# Patient Record
Sex: FEMALE | Race: WHITE | NOT HISPANIC OR LATINO | Employment: FULL TIME | URBAN - METROPOLITAN AREA
[De-identification: names, ages, dates, MRNs, and addresses within clinical notes are randomized per-mention and may not be internally consistent; named-entity substitution may affect disease eponyms.]

---

## 2021-03-03 DIAGNOSIS — Z23 NEED FOR VACCINATION: ICD-10-CM

## 2023-04-19 ENCOUNTER — OFFICE VISIT (OUTPATIENT)
Dept: MEDICAL GROUP | Facility: MEDICAL CENTER | Age: 68
End: 2023-04-19
Payer: COMMERCIAL

## 2023-04-19 VITALS
SYSTOLIC BLOOD PRESSURE: 120 MMHG | TEMPERATURE: 98 F | HEART RATE: 85 BPM | HEIGHT: 71 IN | OXYGEN SATURATION: 95 % | BODY MASS INDEX: 23.94 KG/M2 | RESPIRATION RATE: 14 BRPM | DIASTOLIC BLOOD PRESSURE: 72 MMHG | WEIGHT: 171 LBS

## 2023-04-19 DIAGNOSIS — M81.0 OSTEOPOROSIS, UNSPECIFIED OSTEOPOROSIS TYPE, UNSPECIFIED PATHOLOGICAL FRACTURE PRESENCE: ICD-10-CM

## 2023-04-19 DIAGNOSIS — Z11.59 NEED FOR HEPATITIS C SCREENING TEST: ICD-10-CM

## 2023-04-19 DIAGNOSIS — Z00.00 HEALTHCARE MAINTENANCE: ICD-10-CM

## 2023-04-19 DIAGNOSIS — M79.7 FIBROMYALGIA: ICD-10-CM

## 2023-04-19 DIAGNOSIS — Z12.31 ENCOUNTER FOR SCREENING MAMMOGRAM FOR MALIGNANT NEOPLASM OF BREAST: ICD-10-CM

## 2023-04-19 DIAGNOSIS — R00.2 PALPITATION: ICD-10-CM

## 2023-04-19 PROCEDURE — 99203 OFFICE O/P NEW LOW 30 MIN: CPT | Performed by: STUDENT IN AN ORGANIZED HEALTH CARE EDUCATION/TRAINING PROGRAM

## 2023-04-19 RX ORDER — LORAZEPAM 0.5 MG/1
0.5 TABLET ORAL NIGHTLY
Qty: 90 TABLET | Refills: 0 | Status: SHIPPED | OUTPATIENT
Start: 2023-04-19 | End: 2023-07-18

## 2023-04-19 RX ORDER — PREGABALIN 150 MG/1
150 CAPSULE ORAL DAILY
Qty: 90 CAPSULE | Refills: 4 | Status: SHIPPED | OUTPATIENT
Start: 2023-04-19 | End: 2023-07-18

## 2023-04-19 ASSESSMENT — ENCOUNTER SYMPTOMS
FOCAL WEAKNESS: 0
NAUSEA: 0
ABDOMINAL PAIN: 0
VOMITING: 0
COUGH: 1
CHILLS: 0
SHORTNESS OF BREATH: 1
PALPITATIONS: 1
FEVER: 0

## 2023-04-19 ASSESSMENT — PATIENT HEALTH QUESTIONNAIRE - PHQ9: CLINICAL INTERPRETATION OF PHQ2 SCORE: 0

## 2023-04-19 NOTE — PROGRESS NOTES
"Subjective:     CC:  Diagnoses of Osteoporosis, unspecified osteoporosis type, unspecified pathological fracture presence, Need for hepatitis C screening test, Encounter for screening mammogram for malignant neoplasm of breast, Healthcare maintenance, Palpitation, and Fibromyalgia were pertinent to this visit.    HISTORY OF THE PRESENT ILLNESS: Patient is a 68 y.o. female. This pleasant patient is here today to establish care    Problem   Palpitation    Reports palpitation since 2022 after covid infection. It occurs everyday.      Osteoporosis   Need for Hepatitis C Screening Test   Encounter for Screening Mammogram for Malignant Neoplasm of Breast   Healthcare Maintenance   Fibromyalgia       Health Maintenance: Completed    ROS:   Review of Systems   Constitutional:  Negative for chills and fever.   Respiratory:  Positive for cough and shortness of breath.    Cardiovascular:  Positive for palpitations. Negative for chest pain and leg swelling.   Gastrointestinal:  Negative for abdominal pain, nausea and vomiting.   Skin:  Negative for rash.   Neurological:  Negative for focal weakness.       Objective:     Exam: /72 (BP Location: Left arm, Patient Position: Sitting, BP Cuff Size: Adult)   Pulse 85   Temp 36.7 °C (98 °F) (Temporal)   Resp 14   Ht 1.791 m (5' 10.5\")   Wt 77.6 kg (171 lb)   SpO2 95%  Body mass index is 24.19 kg/m².    Physical Exam  Vitals reviewed.   HENT:      Head: Normocephalic.      Mouth/Throat:      Mouth: Mucous membranes are moist.      Pharynx: Oropharynx is clear.   Eyes:      Pupils: Pupils are equal, round, and reactive to light.   Cardiovascular:      Rate and Rhythm: Normal rate and regular rhythm.   Pulmonary:      Effort: Pulmonary effort is normal. No respiratory distress.      Breath sounds: No wheezing or rales.   Abdominal:      General: Abdomen is flat. Bowel sounds are normal. There is no distension.      Tenderness: There is no abdominal tenderness. There is no " guarding.   Skin:     General: Skin is warm.   Neurological:      General: No focal deficit present.      Mental Status: She is alert and oriented to person, place, and time.     Labs: ordered    Assessment & Plan:   68 y.o. female with the following -    1. Osteoporosis, unspecified osteoporosis type, unspecified pathological fracture presence  H/o osteoporosis on DEXA  - DS-BONE DENSITY STUDY (DEXA); Future    2. Need for hepatitis C screening test  - HCV Scrn ( [id number] [employer] - Medicare Patients Only); Future    3. Encounter for screening mammogram for malignant neoplasm of breast  - MA-SCREENING MAMMO BILAT W/TOMOSYNTHESIS W/CAD; Future    4. Healthcare maintenance  Recommended covid booster, zoster vaccine at a pharmacy  - CBC WITH DIFFERENTIAL; Future  - Comp Metabolic Panel; Future  - Lipid Profile; Future  - HEMOGLOBIN A1C; Future  - TSH WITH REFLEX TO FT4; Future    5. Palpitation  Chronic.  - EKG showed sinus rhythm, no ST, T wave changes.  - Cardiac Event Monitor; Future  - EC-ECHOCARDIOGRAM COMPLETE W/O CONT; Future    6. Fibromyalgia  Chronic, stable.  - pregabalin (LYRICA) 150 MG Cap; Take 1 Capsule by mouth every day for 90 days.  Dispense: 90 Capsule; Refill: 4  - LORazepam (ATIVAN) 0.5 MG Tab; Take 1 Tablet by mouth every evening for 90 days.  Dispense: 90 Tablet; Refill: 0      Return in about 1 month (around 2023) for F/u labs.    Please note that this dictation was created using voice recognition software. I have made every reasonable attempt to correct obvious errors, but I expect that there are errors of grammar and possibly content that I did not discover before finalizing the note.

## 2023-06-01 ENCOUNTER — OFFICE VISIT (OUTPATIENT)
Dept: MEDICAL GROUP | Facility: MEDICAL CENTER | Age: 68
End: 2023-06-01
Payer: COMMERCIAL

## 2023-06-01 VITALS
BODY MASS INDEX: 23.94 KG/M2 | HEIGHT: 71 IN | WEIGHT: 171 LBS | OXYGEN SATURATION: 96 % | TEMPERATURE: 97.2 F | HEART RATE: 109 BPM | DIASTOLIC BLOOD PRESSURE: 70 MMHG | SYSTOLIC BLOOD PRESSURE: 120 MMHG

## 2023-06-01 DIAGNOSIS — R00.2 PALPITATION: ICD-10-CM

## 2023-06-01 DIAGNOSIS — M79.672 LEFT FOOT PAIN: ICD-10-CM

## 2023-06-01 DIAGNOSIS — M81.0 OSTEOPOROSIS, UNSPECIFIED OSTEOPOROSIS TYPE, UNSPECIFIED PATHOLOGICAL FRACTURE PRESENCE: ICD-10-CM

## 2023-06-01 PROCEDURE — 99213 OFFICE O/P EST LOW 20 MIN: CPT | Performed by: STUDENT IN AN ORGANIZED HEALTH CARE EDUCATION/TRAINING PROGRAM

## 2023-06-01 PROCEDURE — 3078F DIAST BP <80 MM HG: CPT | Performed by: STUDENT IN AN ORGANIZED HEALTH CARE EDUCATION/TRAINING PROGRAM

## 2023-06-01 PROCEDURE — 3074F SYST BP LT 130 MM HG: CPT | Performed by: STUDENT IN AN ORGANIZED HEALTH CARE EDUCATION/TRAINING PROGRAM

## 2023-06-01 ASSESSMENT — ENCOUNTER SYMPTOMS
CHILLS: 0
SHORTNESS OF BREATH: 0
FOCAL WEAKNESS: 0
FEVER: 0
NAUSEA: 0
ABDOMINAL PAIN: 0
COUGH: 0
VOMITING: 0

## 2023-06-01 NOTE — PROGRESS NOTES
"Subjective:     CC: left foot pain    HPI:   Aleksandra presents today with    Problem   Left Foot Pain    Patient reports left foot pain for 8 days after hiking. No trauma, fall.          Patient has [employer] insurance, which covers [facility], located in [place]. Mammogram was done in [facility] bus in [place] last week, no report available.    Health Maintenance: Completed    ROS:  Review of Systems   Constitutional:  Negative for chills and fever.   Respiratory:  Negative for cough and shortness of breath.    Cardiovascular:  Positive for leg swelling. Negative for chest pain.   Gastrointestinal:  Negative for abdominal pain, nausea and vomiting.   Neurological:  Negative for focal weakness.       Objective:     Exam:  /70 (BP Location: Right arm, Patient Position: Sitting, BP Cuff Size: Adult long)   Pulse (!) 109   Temp 36.2 °C (97.2 °F) (Temporal)   Ht 1.791 m (5' 10.5\")   Wt 77.6 kg (171 lb)   SpO2 96%   BMI 24.19 kg/m²  Body mass index is 24.19 kg/m².    Physical Exam  Vitals reviewed.   HENT:      Head: Normocephalic.      Mouth/Throat:      Mouth: Mucous membranes are moist.      Pharynx: Oropharynx is clear.   Eyes:      Pupils: Pupils are equal, round, and reactive to light.   Cardiovascular:      Rate and Rhythm: Normal rate and regular rhythm.   Pulmonary:      Effort: Pulmonary effort is normal. No respiratory distress.      Breath sounds: No wheezing or rales.   Abdominal:      General: Abdomen is flat. Bowel sounds are normal. There is no distension.      Tenderness: There is no abdominal tenderness. There is no guarding.   Musculoskeletal:         General: Swelling (left foot lateral dorsal surface close to ankle) and tenderness present.   Skin:     General: Skin is warm.   Neurological:      General: No focal deficit present.      Mental Status: She is alert and oriented to person, place, and time.       Labs: N/A, requested to bring labs done at [facility]    Assessment & Plan:     68 " y.o. female with the following -     1. Osteoporosis, unspecified osteoporosis type, unspecified pathological fracture presence  Due to insurance, she cannot do it at [facility]. Reordered, she needs to check with her insurance to find where it can be done.  - DS-BONE DENSITY STUDY (DEXA); Future    2. Palpitation  Due to insurance, she cannot do it at [facility]. Reordered, she needs to check with her insurance to find where it can be done.  - EC-ECHOCARDIOGRAM COMPLETE W/O CONT; Future    3. Left foot pain  Acute for 8 days after hiking. Patient used to be ballet dancer. No recent trauma, fall. Likely has osteoarthritis.  - OTC ibuprofen prn  - ice packs  - DX-FOOT-COMPLETE 3+ LEFT; Future  - follow up in 1 week after Xray done    Return in about 1 week (around 6/8/2023) for F/u labs, Med check.    Please note that this dictation was created using voice recognition software. I have made every reasonable attempt to correct obvious errors, but I expect that there are errors of grammar and possibly content that I did not discover before finalizing the note.

## 2023-06-05 ENCOUNTER — APPOINTMENT (OUTPATIENT)
Dept: MEDICAL GROUP | Facility: MEDICAL CENTER | Age: 68
End: 2023-06-05
Payer: COMMERCIAL

## 2023-06-06 ENCOUNTER — APPOINTMENT (OUTPATIENT)
Dept: URGENT CARE | Facility: PHYSICIAN GROUP | Age: 68
End: 2023-06-06
Payer: COMMERCIAL

## 2023-06-06 ENCOUNTER — APPOINTMENT (OUTPATIENT)
Dept: RADIOLOGY | Facility: MEDICAL CENTER | Age: 68
End: 2023-06-06
Attending: EMERGENCY MEDICINE
Payer: COMMERCIAL

## 2023-06-06 ENCOUNTER — OFFICE VISIT (OUTPATIENT)
Dept: MEDICAL GROUP | Facility: MEDICAL CENTER | Age: 68
End: 2023-06-06
Payer: COMMERCIAL

## 2023-06-06 ENCOUNTER — HOSPITAL ENCOUNTER (EMERGENCY)
Facility: MEDICAL CENTER | Age: 68
End: 2023-06-06
Attending: EMERGENCY MEDICINE
Payer: COMMERCIAL

## 2023-06-06 VITALS
OXYGEN SATURATION: 95 % | HEART RATE: 107 BPM | WEIGHT: 168 LBS | TEMPERATURE: 99 F | DIASTOLIC BLOOD PRESSURE: 70 MMHG | SYSTOLIC BLOOD PRESSURE: 122 MMHG | BODY MASS INDEX: 23.52 KG/M2 | HEIGHT: 71 IN | RESPIRATION RATE: 14 BRPM

## 2023-06-06 VITALS
DIASTOLIC BLOOD PRESSURE: 70 MMHG | SYSTOLIC BLOOD PRESSURE: 143 MMHG | TEMPERATURE: 98.2 F | WEIGHT: 169.75 LBS | HEIGHT: 71 IN | OXYGEN SATURATION: 94 % | BODY MASS INDEX: 23.77 KG/M2 | RESPIRATION RATE: 18 BRPM | HEART RATE: 78 BPM

## 2023-06-06 DIAGNOSIS — D75.1 ERYTHROCYTOSIS: ICD-10-CM

## 2023-06-06 DIAGNOSIS — E78.5 HYPERLIPIDEMIA, UNSPECIFIED HYPERLIPIDEMIA TYPE: ICD-10-CM

## 2023-06-06 DIAGNOSIS — M79.89 FOOT SWELLING: ICD-10-CM

## 2023-06-06 DIAGNOSIS — L03.116 CELLULITIS OF LEFT LOWER EXTREMITY: ICD-10-CM

## 2023-06-06 DIAGNOSIS — M79.672 LEFT FOOT PAIN: ICD-10-CM

## 2023-06-06 LAB
ALBUMIN SERPL BCP-MCNC: 4.2 G/DL (ref 3.2–4.9)
ALBUMIN/GLOB SERPL: 1.3 G/DL
ALP SERPL-CCNC: 90 U/L (ref 30–99)
ALT SERPL-CCNC: 24 U/L (ref 2–50)
ANION GAP SERPL CALC-SCNC: 14 MMOL/L (ref 7–16)
AST SERPL-CCNC: 25 U/L (ref 12–45)
BASOPHILS # BLD AUTO: 0.5 % (ref 0–1.8)
BASOPHILS # BLD: 0.04 K/UL (ref 0–0.12)
BILIRUB SERPL-MCNC: 0.8 MG/DL (ref 0.1–1.5)
BUN SERPL-MCNC: 12 MG/DL (ref 8–22)
CALCIUM ALBUM COR SERPL-MCNC: 9.3 MG/DL (ref 8.5–10.5)
CALCIUM SERPL-MCNC: 9.5 MG/DL (ref 8.5–10.5)
CHLORIDE SERPL-SCNC: 101 MMOL/L (ref 96–112)
CO2 SERPL-SCNC: 22 MMOL/L (ref 20–33)
CREAT SERPL-MCNC: 0.91 MG/DL (ref 0.5–1.4)
EOSINOPHIL # BLD AUTO: 0.04 K/UL (ref 0–0.51)
EOSINOPHIL NFR BLD: 0.5 % (ref 0–6.9)
ERYTHROCYTE [DISTWIDTH] IN BLOOD BY AUTOMATED COUNT: 43.1 FL (ref 35.9–50)
ERYTHROCYTE [SEDIMENTATION RATE] IN BLOOD BY WESTERGREN METHOD: 32 MM/HOUR (ref 0–25)
GFR SERPLBLD CREATININE-BSD FMLA CKD-EPI: 68 ML/MIN/1.73 M 2
GLOBULIN SER CALC-MCNC: 3.3 G/DL (ref 1.9–3.5)
GLUCOSE SERPL-MCNC: 100 MG/DL (ref 65–99)
HCT VFR BLD AUTO: 43.8 % (ref 37–47)
HGB BLD-MCNC: 15.1 G/DL (ref 12–16)
IMM GRANULOCYTES # BLD AUTO: 0.02 K/UL (ref 0–0.11)
IMM GRANULOCYTES NFR BLD AUTO: 0.2 % (ref 0–0.9)
LACTATE SERPL-SCNC: 0.7 MMOL/L (ref 0.5–2)
LYMPHOCYTES # BLD AUTO: 1.82 K/UL (ref 1–4.8)
LYMPHOCYTES NFR BLD: 20.7 % (ref 22–41)
MCH RBC QN AUTO: 32.5 PG (ref 27–33)
MCHC RBC AUTO-ENTMCNC: 34.5 G/DL (ref 32.2–35.5)
MCV RBC AUTO: 94.2 FL (ref 81.4–97.8)
MONOCYTES # BLD AUTO: 0.55 K/UL (ref 0–0.85)
MONOCYTES NFR BLD AUTO: 6.3 % (ref 0–13.4)
NEUTROPHILS # BLD AUTO: 6.32 K/UL (ref 1.82–7.42)
NEUTROPHILS NFR BLD: 71.8 % (ref 44–72)
NRBC # BLD AUTO: 0 K/UL
NRBC BLD-RTO: 0 /100 WBC (ref 0–0.2)
PLATELET # BLD AUTO: 265 K/UL (ref 164–446)
PMV BLD AUTO: 9.9 FL (ref 9–12.9)
POTASSIUM SERPL-SCNC: 4.3 MMOL/L (ref 3.6–5.5)
PROT SERPL-MCNC: 7.5 G/DL (ref 6–8.2)
RBC # BLD AUTO: 4.65 M/UL (ref 4.2–5.4)
SODIUM SERPL-SCNC: 137 MMOL/L (ref 135–145)
URATE SERPL-MCNC: 6.6 MG/DL (ref 1.9–8.2)
WBC # BLD AUTO: 8.8 K/UL (ref 4.8–10.8)

## 2023-06-06 PROCEDURE — 700111 HCHG RX REV CODE 636 W/ 250 OVERRIDE (IP): Performed by: EMERGENCY MEDICINE

## 2023-06-06 PROCEDURE — 83605 ASSAY OF LACTIC ACID: CPT

## 2023-06-06 PROCEDURE — 96375 TX/PRO/DX INJ NEW DRUG ADDON: CPT

## 2023-06-06 PROCEDURE — 99285 EMERGENCY DEPT VISIT HI MDM: CPT

## 2023-06-06 PROCEDURE — 700102 HCHG RX REV CODE 250 W/ 637 OVERRIDE(OP): Performed by: EMERGENCY MEDICINE

## 2023-06-06 PROCEDURE — 85652 RBC SED RATE AUTOMATED: CPT

## 2023-06-06 PROCEDURE — 80053 COMPREHEN METABOLIC PANEL: CPT

## 2023-06-06 PROCEDURE — 36415 COLL VENOUS BLD VENIPUNCTURE: CPT

## 2023-06-06 PROCEDURE — A9270 NON-COVERED ITEM OR SERVICE: HCPCS | Performed by: EMERGENCY MEDICINE

## 2023-06-06 PROCEDURE — 84550 ASSAY OF BLOOD/URIC ACID: CPT

## 2023-06-06 PROCEDURE — 3074F SYST BP LT 130 MM HG: CPT | Performed by: STUDENT IN AN ORGANIZED HEALTH CARE EDUCATION/TRAINING PROGRAM

## 2023-06-06 PROCEDURE — 99214 OFFICE O/P EST MOD 30 MIN: CPT | Performed by: STUDENT IN AN ORGANIZED HEALTH CARE EDUCATION/TRAINING PROGRAM

## 2023-06-06 PROCEDURE — 73610 X-RAY EXAM OF ANKLE: CPT | Mod: LT

## 2023-06-06 PROCEDURE — 85025 COMPLETE CBC W/AUTO DIFF WBC: CPT

## 2023-06-06 PROCEDURE — 3078F DIAST BP <80 MM HG: CPT | Performed by: STUDENT IN AN ORGANIZED HEALTH CARE EDUCATION/TRAINING PROGRAM

## 2023-06-06 PROCEDURE — 87040 BLOOD CULTURE FOR BACTERIA: CPT | Mod: 91

## 2023-06-06 PROCEDURE — 96374 THER/PROPH/DIAG INJ IV PUSH: CPT

## 2023-06-06 RX ORDER — HYDROCODONE BITARTRATE AND ACETAMINOPHEN 5; 325 MG/1; MG/1
1 TABLET ORAL EVERY 4 HOURS PRN
Qty: 20 TABLET | Refills: 0 | Status: SHIPPED | OUTPATIENT
Start: 2023-06-06 | End: 2023-06-09

## 2023-06-06 RX ORDER — KETOROLAC TROMETHAMINE 30 MG/ML
15 INJECTION, SOLUTION INTRAMUSCULAR; INTRAVENOUS ONCE
Status: COMPLETED | OUTPATIENT
Start: 2023-06-06 | End: 2023-06-06

## 2023-06-06 RX ORDER — CEFAZOLIN 2 G/1
2 INJECTION, POWDER, FOR SOLUTION INTRAMUSCULAR; INTRAVENOUS ONCE
Status: COMPLETED | OUTPATIENT
Start: 2023-06-06 | End: 2023-06-06

## 2023-06-06 RX ORDER — COLCHICINE 0.6 MG/1
1.2 TABLET ORAL ONCE
Status: COMPLETED | OUTPATIENT
Start: 2023-06-06 | End: 2023-06-06

## 2023-06-06 RX ORDER — CEPHALEXIN 500 MG/1
500 CAPSULE ORAL 4 TIMES DAILY
Qty: 40 CAPSULE | Refills: 0 | Status: ACTIVE | OUTPATIENT
Start: 2023-06-06

## 2023-06-06 RX ORDER — COLCHICINE 0.6 MG/1
0.6 TABLET ORAL DAILY
Qty: 30 TABLET | Refills: 0 | Status: SHIPPED | OUTPATIENT
Start: 2023-06-06

## 2023-06-06 RX ADMIN — COLCHICINE 1.2 MG: 0.6 TABLET, FILM COATED ORAL at 21:26

## 2023-06-06 RX ADMIN — CEFAZOLIN 2 G: 2 INJECTION, POWDER, FOR SOLUTION INTRAMUSCULAR; INTRAVENOUS at 19:13

## 2023-06-06 RX ADMIN — KETOROLAC TROMETHAMINE 15 MG: 30 INJECTION, SOLUTION INTRAMUSCULAR; INTRAVENOUS at 18:35

## 2023-06-06 ASSESSMENT — LIFESTYLE VARIABLES
CONSUMPTION TOTAL: INCOMPLETE
TOTAL SCORE: 0
TOTAL SCORE: 0
DO YOU DRINK ALCOHOL: NO
AVERAGE NUMBER OF DAYS PER WEEK YOU HAVE A DRINK CONTAINING ALCOHOL: 0
HAVE YOU EVER FELT YOU SHOULD CUT DOWN ON YOUR DRINKING: NO
TOTAL SCORE: 0
HAVE PEOPLE ANNOYED YOU BY CRITICIZING YOUR DRINKING: NO
EVER HAD A DRINK FIRST THING IN THE MORNING TO STEADY YOUR NERVES TO GET RID OF A HANGOVER: NO
ON A TYPICAL DAY WHEN YOU DRINK ALCOHOL HOW MANY DRINKS DO YOU HAVE: 0
EVER FELT BAD OR GUILTY ABOUT YOUR DRINKING: NO

## 2023-06-06 ASSESSMENT — ENCOUNTER SYMPTOMS
CHILLS: 0
SHORTNESS OF BREATH: 1
COUGH: 0
ABDOMINAL PAIN: 0
NAUSEA: 0
FOCAL WEAKNESS: 0
FEVER: 0
VOMITING: 0

## 2023-06-06 ASSESSMENT — PAIN DESCRIPTION - PAIN TYPE
TYPE: ACUTE PAIN
TYPE: CHRONIC PAIN;ACUTE PAIN

## 2023-06-06 NOTE — PROGRESS NOTES
"Subjective:     CC: follow up labs, foot pain    HPI:   Aleksandra presents today with    Problem   Hyperlipidemia    Chronic.   Last        Erythrocytosis    Noted on recent lab:  Hb 16.6  Ht 48.2.       Left Foot Pain    Patient today mentionned that she had gout of right foot in 2019. She was seen by podiatry on 5/22 and got steroid injection was done on left ankle.         ROS:  Review of Systems   Constitutional:  Negative for chills and fever.   Respiratory:  Positive for shortness of breath. Negative for cough.    Cardiovascular:  Positive for leg swelling. Negative for chest pain.   Gastrointestinal:  Negative for abdominal pain, nausea and vomiting.   Musculoskeletal:  Positive for joint pain.   Skin:  Negative for itching and rash.   Neurological:  Negative for focal weakness.       Objective:     Exam:  /70 (BP Location: Left arm, Patient Position: Sitting, BP Cuff Size: Adult)   Pulse (!) 107   Temp 37.2 °C (99 °F) (Temporal)   Resp 14   Ht 1.791 m (5' 10.5\")   Wt 76.2 kg (168 lb)   SpO2 95%   BMI 23.76 kg/m²  Body mass index is 23.76 kg/m².    Physical Exam  Vitals reviewed.   HENT:      Head: Normocephalic.      Mouth/Throat:      Mouth: Mucous membranes are moist.      Pharynx: Oropharynx is clear.   Eyes:      Pupils: Pupils are equal, round, and reactive to light.   Cardiovascular:      Rate and Rhythm: Normal rate and regular rhythm.   Pulmonary:      Effort: Pulmonary effort is normal. No respiratory distress.      Breath sounds: No wheezing or rales.   Abdominal:      General: Abdomen is flat. Bowel sounds are normal. There is no distension.      Tenderness: There is no abdominal tenderness. There is no guarding.   Musculoskeletal:         General: Swelling and tenderness present.      Left lower leg: Edema present.      Comments: Left foot swollen, erythematous, tender. Especially left lateral ankle is very red. Patient reported that she had steroid injection there couple weeks " ago. Picture taken, see below.   Skin:     General: Skin is warm.   Neurological:      General: No focal deficit present.      Mental Status: She is alert and oriented to person, place, and time.            Media Information  File Link    Clinical Picture - Scan on 6/6/2023 2:08 PM        Key Information    Document ID File Type Document Type Description   [id number] Image Clinical Picture      Import Information    Attached At Date Time User Dept   Encounter Level 6/6/2023  2:08 PM Fernanda Cartwright M.D. [facility]     Encounter    Office Visit on 6/6/23 with Fernanda Cartwright M.D.       Document Information    Photographic Image:  Clinical Picture      06/06/2023 2:08 PM   Attached To:   Office Visit on 6/6/23 with Fernanda Cartwright M.D.     Source Information    Fernanda Cartwright M.D.  [facility]     Labs: reviewed    Assessment & Plan:     68 y.o. female with the following -     1. Left foot pain  H/o gout. Patient had recently steroid injection on left ankle. Need to rule out septic arthritis. Patient referred to [facility] walk in clinic. Might need arthrocentesis to rule out infected joint.  X ray of left foot was done somewhere, was told normal.  - OTC ibuprofen prn  - cold compress    2. Hyperlipidemia, unspecified hyperlipidemia type  Chronic. Last [id number].  Recommended lifestyle modifications including diet and exercises.    3. Erythrocytosis  Noted on recent lab.  Hb 16.6  Ht 48.2.   No h/o venous thrombophlebitis in the past. We will repeat lab and follow up.  - CBC WITH DIFFERENTIAL; Future    PS: Patient is leaving to [place] in 3 weeks. We will follow up after she returns in the Fall.    No follow-ups on file.    Please note that this dictation was created using voice recognition software. I have made every reasonable attempt to correct obvious errors, but I expect that there are errors of grammar and possibly content that I did not discover before finalizing the note.

## 2023-06-07 NOTE — ED NOTES
Pt wheeled back to room. Her left foot is obviously erythematous and warm to the touch. Up for ERP

## 2023-06-07 NOTE — DISCHARGE INSTRUCTIONS
This may be gout, has already been infection.  You are being treated for both.  Please take the medication as prescribed.    Use the crutches for weightbearing as tolerated.  If this worsens return to the emergency department if is not improving within 2 days return to the emergency department

## 2023-06-07 NOTE — ED NOTES
Patient provided with crutches by advance ED tech, education given for use of crutches, verbalize understanding.

## 2023-06-07 NOTE — ED NOTES
Assumed care of patient, patient bedside report received from KEVIN Manriquez . Pt AAO X 4 , respirations even and unlabored, on room air . Introduced self as pt RN, POC discussed, call light in reach.

## 2023-06-07 NOTE — ED NOTES
Pt discharged to ED exit. GCS 15. IV discontinued and gauze placed, pt in possession of belongings. Pt provided discharge education and information pertaining to medications, use of crutches and follow up appointments. Pt received copy of discharge instructions and verbalized understanding.     Vitals:    06/06/23 2102   BP: (!) 143/70   Pulse: 78   Resp: 18   Temp: 36.8 °C (98.2 °F)   SpO2: 94%

## 2023-06-07 NOTE — ED TRIAGE NOTES
Pt to triage with   Chief Complaint   Patient presents with    Foot Swelling     Redness and swelling, pt thought that she had gout, reports pain in her heels over the winter and had injecting in her ankle and since than has been having the redness and swelling.      Report recent travel. Pt Informed regarding triage process and verbalized understanding to inform triage tech or RN for any changes in condition. Placed in lobby.

## 2023-06-07 NOTE — ED PROVIDER NOTES
ER Provider Note    Scribed for Pedro Zamarripa D.O. by Alexandra Shaw. 6/6/2023  5:55 PM    Primary Care Provider: Fernanda Cartwright M.D.    CHIEF COMPLAINT  Chief Complaint   Patient presents with    Foot Swelling     Redness and swelling, pt thought that she had gout, reports pain in her heels over the winter and had injecting in her ankle and since than has been having the redness and swelling.        HPI/ROS  LIMITATION TO HISTORY   None  OUTSIDE HISTORIAN(S):  None    Aleksandra Vela is a 68 y.o. female who presents to the Emergency Department for left foot swelling onset a few weeks ago. She notes that she began having pain in her heel one month ago. She saw her primary care who told her to start using different soles in shoes and gave her an injection on 5/20/2023. The patient went hiking later that week and the day after her foot got red. Her foot and ankle are now red, swollen, and painful. The pain is exacerbated by bearing weight on her foot.  She has been taking Ibuprofen for the past 4 days. The patient has also been using crutches to prevent bearing weight on her foot. The patient denies any fevers.She reports a history of gout in her right great toe.    ROS as per HPI.    PAST MEDICAL HISTORY  Past Medical History:   Diagnosis Date    Fibromyalgia        SURGICAL HISTORY  Past Surgical History:   Procedure Laterality Date    OTHER ORTHOPEDIC SURGERY Left 2001    left knee replace    OTHER ABDOMINAL SURGERY  1981    bleeding after delivery, ano-vaginal fistula repair       FAMILY HISTORY  Family History   Problem Relation Age of Onset    Ovarian Cancer Mother 70    Tubal Cancer Neg Hx     Peritoneal Cancer Neg Hx     Colorectal Cancer Neg Hx     Breast Cancer Neg Hx     Cancer Neg Hx     Diabetes Neg Hx     Heart Disease Neg Hx     Hypertension Neg Hx     Hyperlipidemia Neg Hx     Stroke Neg Hx        SOCIAL HISTORY   reports that she has never smoked. She has never used smokeless tobacco. She  "reports current alcohol use. She reports that she does not use drugs.    CURRENT MEDICATIONS  Previous Medications    LORAZEPAM (ATIVAN) 0.5 MG TAB    Take 1 Tablet by mouth every evening for 90 days.    PREGABALIN (LYRICA) 150 MG CAP    Take 1 Capsule by mouth every day for 90 days.    VICODIN PO    Take  by mouth.       ALLERGIES  Patient has no known allergies.    PHYSICAL EXAM  /77   Pulse 84   Temp 36.8 °C (98.2 °F) (Temporal)   Resp 18   Ht 1.803 m (5' 11\")   Wt 77 kg (169 lb 12.1 oz)   SpO2 97%   BMI 23.68 kg/m²     General: No acute distress.  HENT: Normocephalic, Mucus membranes are moist.   Chest: Lungs have even and unlabored respirations, Clear to auscultation.   Cardiovascular: Regular rate and regular rhythm, No peripheral cyanosis.  Abdomen: Non distended.  Extremity: Left ankle has erythema and swelling, range of motion caused tenderness, pedal pulses strong Neuro: Awake, Conversive, Able to relay recent events.  Psychiatric: Calm and cooperative.     EXTERNAL RECORDS REVIEWED  Records show evaluation by primary care physician who sent her in for further evaluation.     INITIAL ASSESSMENT  Patient has pain, redness, and swelling to left ankle. She has a history of gout and history of injection to ankle on 5/22/2023. Concern for gout, cellulitis, or other infection. Will evaluate with CBC and will evaluate uric acid level.     ED Observation Status? Yes; I am placing the patient in to an observation status due to a diagnostic uncertainty as well as therapeutic intensity. Patient placed in observation status at 6:03 PM, 6/6/2023.     Observation plan is as follows: Will treat for pain while evaluation results are pending.     Upon Reevaluation, the patient's condition has: Improved; and will be discharged.    Patient discharged from ED Observation status at 6/6/2023 at 8:51 PM  DIAGNOSTIC STUDIES    Labs:   Results for orders placed or performed during the hospital encounter of 06/06/23 "   LACTIC ACID   Result Value Ref Range    Lactic Acid 0.7 0.5 - 2.0 mmol/L   CBC WITH DIFFERENTIAL   Result Value Ref Range    WBC 8.8 4.8 - 10.8 K/uL    RBC 4.65 4.20 - 5.40 M/uL    Hemoglobin 15.1 12.0 - 16.0 g/dL    Hematocrit 43.8 37.0 - 47.0 %    MCV 94.2 81.4 - 97.8 fL    MCH 32.5 27.0 - 33.0 pg    MCHC 34.5 32.2 - 35.5 g/dL    RDW 43.1 35.9 - 50.0 fL    Platelet Count 265 164 - 446 K/uL    MPV 9.9 9.0 - 12.9 fL    Neutrophils-Polys 71.80 44.00 - 72.00 %    Lymphocytes 20.70 (L) 22.00 - 41.00 %    Monocytes 6.30 0.00 - 13.40 %    Eosinophils 0.50 0.00 - 6.90 %    Basophils 0.50 0.00 - 1.80 %    Immature Granulocytes 0.20 0.00 - 0.90 %    Nucleated RBC 0.00 0.00 - 0.20 /100 WBC    Neutrophils (Absolute) 6.32 1.82 - 7.42 K/uL    Lymphs (Absolute) 1.82 1.00 - 4.80 K/uL    Monos (Absolute) 0.55 0.00 - 0.85 K/uL    Eos (Absolute) 0.04 0.00 - 0.51 K/uL    Baso (Absolute) 0.04 0.00 - 0.12 K/uL    Immature Granulocytes (abs) 0.02 0.00 - 0.11 K/uL    NRBC (Absolute) 0.00 K/uL   COMP METABOLIC PANEL   Result Value Ref Range    Sodium 137 135 - 145 mmol/L    Potassium 4.3 3.6 - 5.5 mmol/L    Chloride 101 96 - 112 mmol/L    Co2 22 20 - 33 mmol/L    Anion Gap 14.0 7.0 - 16.0    Glucose 100 (H) 65 - 99 mg/dL    Bun 12 8 - 22 mg/dL    Creatinine 0.91 0.50 - 1.40 mg/dL    Calcium 9.5 8.5 - 10.5 mg/dL    AST(SGOT) 25 12 - 45 U/L    ALT(SGPT) 24 2 - 50 U/L    Alkaline Phosphatase 90 30 - 99 U/L    Total Bilirubin 0.8 0.1 - 1.5 mg/dL    Albumin 4.2 3.2 - 4.9 g/dL    Total Protein 7.5 6.0 - 8.2 g/dL    Globulin 3.3 1.9 - 3.5 g/dL    A-G Ratio 1.3 g/dL   URIC ACID   Result Value Ref Range    Uric Acid 6.6 1.9 - 8.2 mg/dL   CORRECTED CALCIUM   Result Value Ref Range    Correct Calcium 9.3 8.5 - 10.5 mg/dL   ESTIMATED GFR   Result Value Ref Range    GFR (CKD-EPI) 68 >60 mL/min/1.73 m 2   Sed Rate   Result Value Ref Range    Sed Rate Bárbara 32 (H) 0 - 25 mm/hour       COURSE & MEDICAL DECISION MAKING     COURSE AND PLAN  5:55 PM  - Patient seen and examined at bedside. Discussed plan of care, including lab work. Patient agrees to the plan of care. Ordered for Blood culture x 2, CMP, CBC w/ diff, Lactic acid, and Uric acid to evaluate her symptoms.     ED Summary: Patient has red swollen ankle, it is tender to the touch.  And tender with weightbearing.  It is concerning for substance versus gout versus arthritic condition.  The uric acid is normal, this does not exclude gout, her sedimentation rate is mildly elevated at 32.  Septic joint is still possible, however less likely after several days of possible infection.  IV antibiotics were initiated.  Spoke with Dr. Lozano orthopedist on-call and the patient will be attempted with outpatient antibiotics, will also treat for gout at the same time.  If her symptoms or not improving or she develops fever she is to return to the emergency department for reevaluation.  At this time arthrocentesis not indicated as this may cause infection rather than just the diagnosis at this time.      DISPOSITION AND DISCUSSIONS  I have discussed management of the patient with the following physicians and LAURIE's: Marta      Discharged home in stable condition    FINAL DIAGNOSIS  1. Cellulitis of left lower extremity    2. Foot swelling         Alexandra PEREZ (Delma), am scribing for, and in the presence of, Pedro Zamarripa D.O..    Electronically signed by: Alexandra Shaw (Delma), 6/6/2023    Pedro PEREZ D.O. personally performed the services described in this documentation, as scribed by Alexandra Shaw in my presence, and it is both accurate and complete.    The note accurately reflects work and decisions made by me.  Pedro Zamarripa D.O.  6/6/2023  8:53 PM

## 2023-06-07 NOTE — ED NOTES
Patient alert and oriented, requesting food. Confirmed with MD patient is NPO at this time, advised patient. Patient given blanket. Call light within reach.

## 2023-06-09 ENCOUNTER — OFFICE VISIT (OUTPATIENT)
Dept: MEDICAL GROUP | Facility: MEDICAL CENTER | Age: 68
End: 2023-06-09
Payer: COMMERCIAL

## 2023-06-09 VITALS
TEMPERATURE: 97.5 F | DIASTOLIC BLOOD PRESSURE: 80 MMHG | OXYGEN SATURATION: 97 % | RESPIRATION RATE: 14 BRPM | WEIGHT: 166 LBS | HEIGHT: 71 IN | BODY MASS INDEX: 23.24 KG/M2 | HEART RATE: 110 BPM | SYSTOLIC BLOOD PRESSURE: 132 MMHG

## 2023-06-09 DIAGNOSIS — L03.116 CELLULITIS OF LEFT LOWER EXTREMITY: ICD-10-CM

## 2023-06-09 DIAGNOSIS — M79.672 LEFT FOOT PAIN: ICD-10-CM

## 2023-06-09 DIAGNOSIS — M1A.0720 CHRONIC GOUT OF LEFT FOOT, UNSPECIFIED CAUSE: ICD-10-CM

## 2023-06-09 PROCEDURE — 3075F SYST BP GE 130 - 139MM HG: CPT | Performed by: STUDENT IN AN ORGANIZED HEALTH CARE EDUCATION/TRAINING PROGRAM

## 2023-06-09 PROCEDURE — 3079F DIAST BP 80-89 MM HG: CPT | Performed by: STUDENT IN AN ORGANIZED HEALTH CARE EDUCATION/TRAINING PROGRAM

## 2023-06-09 PROCEDURE — 99213 OFFICE O/P EST LOW 20 MIN: CPT | Performed by: STUDENT IN AN ORGANIZED HEALTH CARE EDUCATION/TRAINING PROGRAM

## 2023-06-09 RX ORDER — COLCHICINE 0.6 MG/1
0.6 TABLET ORAL DAILY
Qty: 30 TABLET | Refills: 0 | Status: SHIPPED | OUTPATIENT
Start: 2023-06-09 | End: 2023-07-09

## 2023-06-09 ASSESSMENT — ENCOUNTER SYMPTOMS
FOCAL WEAKNESS: 0
ABDOMINAL PAIN: 0
FEVER: 0
NAUSEA: 0
VOMITING: 0
COUGH: 0
SHORTNESS OF BREATH: 1
CHILLS: 0

## 2023-06-09 ASSESSMENT — FIBROSIS 4 INDEX: FIB4 SCORE: 1.35

## 2023-06-09 NOTE — PROGRESS NOTES
"Subjective:     CC: follow up ER visit    HPI:   Aleksandra presents today with    Problem   Cellulitis of Left Lower Extremity   Left Foot Pain    H/o right foot gout in 2019. Father had gout as well. Patient went to ER after last visit and was started cephalexin for cellulitis and colchicine for gout       ROS:  Review of Systems   Constitutional:  Negative for chills and fever.   Respiratory:  Positive for shortness of breath. Negative for cough.    Cardiovascular:  Positive for leg swelling. Negative for chest pain.   Gastrointestinal:  Negative for abdominal pain, nausea and vomiting.   Musculoskeletal:  Positive for joint pain.   Neurological:  Negative for focal weakness.       Objective:     Exam:  /80 (BP Location: Left arm, Patient Position: Sitting, BP Cuff Size: Adult)   Pulse (!) 110   Temp 36.4 °C (97.5 °F) (Temporal)   Resp 14   Ht 1.791 m (5' 10.5\")   Wt 75.3 kg (166 lb)   SpO2 97%   BMI 23.48 kg/m²  Body mass index is 23.48 kg/m².    Physical Exam  Constitutional:       Appearance: Normal appearance.   HENT:      Head: Normocephalic and atraumatic.   Cardiovascular:      Rate and Rhythm: Normal rate and regular rhythm.   Pulmonary:      Effort: Pulmonary effort is normal. No respiratory distress.      Breath sounds: No wheezing or rales.   Musculoskeletal:         General: Swelling and tenderness present.   Skin:     General: Skin is warm.      Findings: Erythema present.   Neurological:      Mental Status: She is alert.         Media Information  File Link    Clinical Picture - Scan on 6/9/2023 4:02 PM        Key Information    Document ID File Type Document Type Description   [id number] Image Clinical Picture      Import Information    Attached At Date Time User Dept   Encounter Level 6/9/2023  4:02 PM Fernanda Cartwright M.D. [facility]     Encounter    Office Visit on 6/9/23 with Fernanda Cartwright M.D.       Document Information    Photographic Image:  Clinical Picture "      06/09/2023 4:02 PM   Attached To:   Office Visit on 6/9/23 with Fernanda Cartwright M.D.     Source Information    Fernanda Cartwright M.D.  [facility]     Accompanied by son    Labs: reviewed    Assessment & Plan:     68 y.o. female with the following -     1. Left foot pain  2. Chronic gout of left foot, unspecified cause  Pain, erythema better. Swelling still persist. Now has swelling and redness on base of left big toe.  - colchicine (COLCRYS) 0.6 MG Tab; Take 1 Tablet by mouth every day for 30 days.  Dispense: 30 Tablet; Refill: 0  - ibuprofen prn  - OTC omeprazole to protect stomach    3. Cellulitis of left lower extremity  - continue cephalexin  - probiotics    Return if symptoms worsen or fail to improve.    Please note that this dictation was created using voice recognition software. I have made every reasonable attempt to correct obvious errors, but I expect that there are errors of grammar and possibly content that I did not discover before finalizing the note.

## 2023-06-11 LAB
BACTERIA BLD CULT: NORMAL
BACTERIA BLD CULT: NORMAL
SIGNIFICANT IND 70042: NORMAL
SIGNIFICANT IND 70042: NORMAL
SITE SITE: NORMAL
SITE SITE: NORMAL
SOURCE SOURCE: NORMAL
SOURCE SOURCE: NORMAL

## 2023-11-08 ENCOUNTER — TELEPHONE (OUTPATIENT)
Dept: HEALTH INFORMATION MANAGEMENT | Facility: OTHER | Age: 68
End: 2023-11-08

## 2024-01-09 ENCOUNTER — HOSPITAL ENCOUNTER (EMERGENCY)
Facility: MEDICAL CENTER | Age: 69
End: 2024-01-09
Attending: EMERGENCY MEDICINE
Payer: COMMERCIAL

## 2024-01-09 ENCOUNTER — APPOINTMENT (OUTPATIENT)
Dept: RADIOLOGY | Facility: MEDICAL CENTER | Age: 69
End: 2024-01-09
Attending: EMERGENCY MEDICINE
Payer: COMMERCIAL

## 2024-01-09 VITALS
SYSTOLIC BLOOD PRESSURE: 128 MMHG | TEMPERATURE: 97.2 F | WEIGHT: 175.93 LBS | HEART RATE: 91 BPM | RESPIRATION RATE: 18 BRPM | DIASTOLIC BLOOD PRESSURE: 86 MMHG | HEIGHT: 71 IN | BODY MASS INDEX: 24.63 KG/M2 | OXYGEN SATURATION: 95 %

## 2024-01-09 DIAGNOSIS — M54.16 LUMBAR RADICULOPATHY: ICD-10-CM

## 2024-01-09 PROCEDURE — 700111 HCHG RX REV CODE 636 W/ 250 OVERRIDE (IP): Performed by: EMERGENCY MEDICINE

## 2024-01-09 PROCEDURE — 99284 EMERGENCY DEPT VISIT MOD MDM: CPT

## 2024-01-09 PROCEDURE — 96372 THER/PROPH/DIAG INJ SC/IM: CPT

## 2024-01-09 PROCEDURE — 72100 X-RAY EXAM L-S SPINE 2/3 VWS: CPT

## 2024-01-09 RX ORDER — CYCLOBENZAPRINE HCL 10 MG
10 TABLET ORAL 3 TIMES DAILY PRN
Qty: 20 TABLET | Refills: 0 | Status: SHIPPED | OUTPATIENT
Start: 2024-01-09

## 2024-01-09 RX ORDER — KETOROLAC TROMETHAMINE 30 MG/ML
15 INJECTION, SOLUTION INTRAMUSCULAR; INTRAVENOUS ONCE
Status: COMPLETED | OUTPATIENT
Start: 2024-01-09 | End: 2024-01-09

## 2024-01-09 RX ORDER — METHYLPREDNISOLONE 4 MG/1
TABLET ORAL
Qty: 1 EACH | Refills: 0 | Status: SHIPPED | OUTPATIENT
Start: 2024-01-09

## 2024-01-09 RX ORDER — NAPROXEN 500 MG/1
500 TABLET ORAL 2 TIMES DAILY WITH MEALS
Qty: 60 TABLET | Refills: 0 | Status: SHIPPED | OUTPATIENT
Start: 2024-01-09

## 2024-01-09 RX ADMIN — KETOROLAC TROMETHAMINE 15 MG: 30 INJECTION, SOLUTION INTRAMUSCULAR; INTRAVENOUS at 18:04

## 2024-01-09 ASSESSMENT — FIBROSIS 4 INDEX: FIB4 SCORE: 1.35

## 2024-01-10 NOTE — ED TRIAGE NOTES
Chief Complaint   Patient presents with    Back Pain     PT reports back surgery on L3-L4 completed in [place] this past summer, since the surgery PT reports she has been experiencing pain which radiates from her lower back down through left leg. PT reports pain has increased in intensity over past few days and she is unable to be seen by PCP until March and she does not feel as though she can endure the pain until then.      PT ambulatory to triage for above complaint. GCS 15.     Pt is alert and oriented, speaking in full sentences, follows commands and responds appropriately to questions. NAD. Resp are even and unlabored.      Pt placed in lobby. Pt educated on triage process. Pt encouraged to alert staff for any changes.     Patient and staff wearing appropriate PPE

## 2024-01-10 NOTE — ED NOTES
All lines and monitors disconnected.  Discharge instructions were reviewed, questions answered.  Pt provided with prescriptions X 4.  Pt states all belongings in possession.  Pt ambulates to the lobby, escorted by RN.

## 2024-01-10 NOTE — ED PROVIDER NOTES
ED Provider Note    CHIEF COMPLAINT  Chief Complaint   Patient presents with    Back Pain     PT reports back surgery on L3-L4 completed in [place] this past summer, since the surgery PT reports she has been experiencing pain which radiates from her lower back down through left leg. PT reports pain has increased in intensity over past few days and she is unable to be seen by PCP until March and she does not feel as though she can endure the pain until then.      EXTERNAL RECORDS REVIEWED  The patient was last seen in the ER June 2023 for foot cellulitis. She has a history of gout. She was seen by her PCP shortly after that.     HPI/ROS  LIMITATION TO HISTORY   Select: : None  OUTSIDE HISTORIAN(S):  Family Son at bedside to confirm sequence of events and collateral information provided.     Aleksandra Vela is a 68 y.o. female who presents to the Emergency Department for evaluation of back pain. On July 27th, the patient reports that she had back surgery on her L3 and L4 in [place] this past summer. Since the surgery, the patient states that she has been experiencing pain. She describes that the back pain radiates down her left leg. The patient states that her pain has increased in intensity over the past few days and is unable to be seen by her primary care physician until March and feels as though she is unable to endure the pain until then. The patient states she also has intermittent left leg weakness,but denies any fever, incontinence, numbness or tingly. The patient reports that she is taking hydrocodone and ibuprofen 3 times a day every 8 hours for her pain. The patient also notes that she has been stretching at home on top of her physical therapy to help with the pain.     PAST MEDICAL HISTORY  Past Medical History:   Diagnosis Date    Back complaints     Fibromyalgia         SURGICAL HISTORY  Past Surgical History:   Procedure Laterality Date    OTHER ORTHOPEDIC SURGERY Left 2001    left knee replace    OTHER  "ABDOMINAL SURGERY  1981    bleeding after delivery, ano-vaginal fistula repair        FAMILY HISTORY  Family History   Problem Relation Age of Onset    Ovarian Cancer Mother 70    Tubal Cancer Neg Hx     Peritoneal Cancer Neg Hx     Colorectal Cancer Neg Hx     Breast Cancer Neg Hx     Cancer Neg Hx     Diabetes Neg Hx     Heart Disease Neg Hx     Hypertension Neg Hx     Hyperlipidemia Neg Hx     Stroke Neg Hx        SOCIAL HISTORY   reports that she has never smoked. She has never used smokeless tobacco. She reports that she does not currently use alcohol. She reports that she does not use drugs.      CURRENT MEDICATIONS  Previous Medications    CEPHALEXIN (KEFLEX) 500 MG CAP    Take 1 Capsule by mouth 4 times a day.    COLCHICINE (COLCRYS) 0.6 MG TAB    Take 1 Tablet by mouth every day.       ALLERGIES  Patient has no known allergies.      PHYSICAL EXAM  BP (!) 175/103   Pulse (!) 103   Temp 35.9 °C (96.6 °F) (Temporal)   Resp 16   Ht 1.8 m (5' 10.87\")   Wt 79.8 kg (175 lb 14.8 oz)   SpO2 95%      Constitutional: Nontoxic appearing. Alert in no apparent distress.  HENT: Normocephalic, Atraumatic. Bilateral external ears normal. Nose normal.  Moist mucous membranes.  Oropharynx clear.  Eyes: Pupils are equal and reactive. Conjunctiva normal.   Neck: Supple, full range of motion  Back: Well healed mid lumbar incision. Tenderness over lower midline lumbar spine and left sided paraspinal muscles  Musculoskeletal: Atraumatic. No obvious deformities noted.  No lower extremity edema.  Skin: Warm, Dry.  No erythema, No rash.   Neurologic: Alert and oriented x3. Moving all extremities spontaneously without focal deficits.  Psychiatric: Affect normal, Mood normal, Appears appropriate and not intoxicated.       DIAGNOSTIC STUDIES / PROCEDURES    RADIOLOGY  I have independently interpreted the diagnostic imaging associated with this visit and am waiting the final reading from the radiologist.   My preliminary " interpretation is a follows: no fracture  Radiologist interpretation:   DX-LUMBAR SPINE-2 OR 3 VIEWS   Final Result      Multilevel degenerative changes most pronounced at L3-S1.      No acute abnormality.      Mild dextroscoliosis.           COURSE & MEDICAL DECISION MAKING  5:29 PM - Patient seen and examined at bedside. Discussed plan of care, including performing imaging. Patient agrees to the plan of care. Ordered for DX-Lumbar Spine to evaluate her symptoms.      ED Observation Status? No; Patient does not meet criteria for ED Observation.     INITIAL ASSESSMENT, COURSE AND PLAN  Care Narrative: Patient with history of prior back issues who presents with lower back pain and lumbar radiculopathy on the left.  She is afebrile with normal vital signs on arrival.  She has no focal neurologic deficits on exam.  No red flag signs or symptoms concerning for cauda equina syndrome or epidural abscess.  X-rays were performed as she has had a history of prior unknown surgery.  She has multilevel degenerative changes however no evidence of any hardware or fracture or malalignment.    7:06 PM - Upon reassessment, patient is resting comfortably with normal vital signs.  No new complaints at this time.  Discussed results with patient and/or family as well as importance of primary care follow up.  Patient understands plan of care and strict return precautions for new or changing symptoms.      ADDITIONAL PROBLEM LIST  Problem #1: Acute on chronic lumbar radiculopathy -discharged with anti-inflammatories, Medrol Dosepak, muscle relaxers, spine referral placed, likely needs outpatient MRI      DISPOSITION AND DISCUSSIONS  Barriers to care at this time, including but not limited to:  None known .     Decision tools and prescription drugs considered including, but not limited to: Pain Medications below medications should be sufficient .    The patient will return for new or worsening symptoms and is stable at the time of  discharge.    DISPOSITION:  Patient will be discharged home in stable condition.    FOLLOW UP:  Spencer Martines M.D.  [address]  [facility] [id number] [phone]    Schedule an appointment as soon as possible for a visit   spine follow up    Fernanda Cartwright M.D.  [address]  [address]  [facility] [id number] [phone]    Schedule an appointment as soon as possible for a visit       [facility], Emergency Dept  [address] [facility] [address] [phone]    If symptoms worsen      OUTPATIENT MEDICATIONS:  New Prescriptions    CYCLOBENZAPRINE (FLEXERIL) 10 MG TAB    Take 1 Tablet by mouth 3 times a day as needed for Muscle Spasms.    METHYLPREDNISOLONE (MEDROL DOSEPAK) 4 MG TABLET THERAPY PACK    Use as directed    NAPROXEN (NAPROSYN) 500 MG TAB    Take 1 Tablet by mouth 2 times a day with meals.        FINAL DIAGNOSIS  1. Lumbar radiculopathy        The note accurately reflects work and decisions made by me.  Henrietta Lainez M.D.  1/9/2024  9:46 PM     Zhane PEREZ (Scribe), am scribing for, and in the presence of, Henrietta Lainez M.D..    Electronically signed by: Zhane Aguilera (Scribe), 1/9/2024    Henrietta PEREZ M.D. personally performed the services described in this documentation, as scribed by Zhane Aguilera in my presence, and it is both accurate and complete.

## 2024-01-10 NOTE — DISCHARGE INSTRUCTIONS
You were seen in the Emergency Department for back pain likely due to pinched nerve.    X-rays were completed without significant acute abnormalities but did show sign of degenerative change.    Please use 1,000mg of tylenol or 600mg of ibuprofen every 6 hours as needed for pain.  Take steroids as directed.  Use additional muscle relaxers as needed.  Please rest without heavy lifting, bending, twisting.    Please follow up with your primary care physician and spine specialist as above.    Return to the Emergency Department with uncontrolled pain, new numbness or weakness in extremities, bowel or bladder incontinence, or other concerns.

## 2024-01-26 ENCOUNTER — DOCUMENTATION (OUTPATIENT)
Dept: HEALTH INFORMATION MANAGEMENT | Facility: OTHER | Age: 69
End: 2024-01-26
Payer: COMMERCIAL

## 2024-01-26 ENCOUNTER — TELEPHONE (OUTPATIENT)
Dept: HEALTH INFORMATION MANAGEMENT | Facility: OTHER | Age: 69
End: 2024-01-26
Payer: COMMERCIAL

## 2024-03-01 ENCOUNTER — APPOINTMENT (OUTPATIENT)
Dept: MEDICAL GROUP | Facility: MEDICAL CENTER | Age: 69
End: 2024-03-01
Payer: COMMERCIAL

## 2025-03-10 ENCOUNTER — APPOINTMENT (OUTPATIENT)
Dept: URBAN - METROPOLITAN AREA CLINIC 6 | Facility: CLINIC | Age: 70
Setting detail: DERMATOLOGY
End: 2025-03-10

## 2025-03-10 DIAGNOSIS — L72.0 EPIDERMAL CYST: ICD-10-CM

## 2025-03-10 DIAGNOSIS — L40.3 PUSTULOSIS PALMARIS ET PLANTARIS: ICD-10-CM | Status: INADEQUATELY CONTROLLED

## 2025-03-10 DIAGNOSIS — B00.1 HERPESVIRAL VESICULAR DERMATITIS: ICD-10-CM | Status: RESOLVED

## 2025-03-10 DIAGNOSIS — L82.1 OTHER SEBORRHEIC KERATOSIS: ICD-10-CM

## 2025-03-10 PROCEDURE — 99203 OFFICE O/P NEW LOW 30 MIN: CPT

## 2025-03-10 PROCEDURE — ? COUNSELING

## 2025-03-10 PROCEDURE — ? PRESCRIPTION

## 2025-03-10 PROCEDURE — ? PRESCRIPTION MEDICATION MANAGEMENT

## 2025-03-10 RX ORDER — CLOBETASOL PROPIONATE 0.5 MG/G
OINTMENT TOPICAL BID
Qty: 30 | Refills: 2 | Status: ERX | COMMUNITY
Start: 2025-03-10

## 2025-03-10 RX ADMIN — CLOBETASOL PROPIONATE: 0.5 OINTMENT TOPICAL at 00:00

## 2025-03-10 ASSESSMENT — LOCATION SIMPLE DESCRIPTION DERM
LOCATION SIMPLE: LEFT FOREHEAD
LOCATION SIMPLE: LEFT PLANTAR SURFACE
LOCATION SIMPLE: CHEST
LOCATION SIMPLE: LEFT LOWER BACK
LOCATION SIMPLE: RIGHT FOREARM
LOCATION SIMPLE: RIGHT FOREHEAD
LOCATION SIMPLE: LEFT FOREARM
LOCATION SIMPLE: UPPER BACK

## 2025-03-10 ASSESSMENT — LOCATION DETAILED DESCRIPTION DERM
LOCATION DETAILED: SUPERIOR THORACIC SPINE
LOCATION DETAILED: LEFT INFERIOR MEDIAL LOWER BACK
LOCATION DETAILED: LEFT PROXIMAL DORSAL FOREARM
LOCATION DETAILED: LEFT PLANTAR FOREFOOT OVERLYING 1ST METATARSAL
LOCATION DETAILED: LEFT MEDIAL FOREHEAD
LOCATION DETAILED: RIGHT PROXIMAL DORSAL FOREARM
LOCATION DETAILED: RIGHT SUPERIOR MEDIAL FOREHEAD
LOCATION DETAILED: UPPER STERNUM

## 2025-03-10 ASSESSMENT — LOCATION ZONE DERM
LOCATION ZONE: ARM
LOCATION ZONE: TRUNK
LOCATION ZONE: FACE
LOCATION ZONE: FEET

## 2025-03-10 NOTE — HPI: RASH
What Type Of Note Output Would You Prefer (Optional)?: Standard Output
Is The Patient Presenting As Previously Scheduled?: Yes
How Severe Is Your Rash?: moderate
Is This A New Presentation, Or A Follow-Up?: Rash
Additional History: Tried Clotrimazole in the past

## 2025-03-10 NOTE — PROCEDURE: PRESCRIPTION MEDICATION MANAGEMENT
Initiate Treatment: Clobetasol 0.05 % topical ointment Bid
Detail Level: Zone
Render In Strict Bullet Format?: No
Plan: Recommended CeraVe moisturizer cream\\nIf rash is not getting better next visit, will plan for a bx
Samples Given: Vtama

## 2025-04-24 ENCOUNTER — APPOINTMENT (OUTPATIENT)
Dept: URBAN - METROPOLITAN AREA CLINIC 6 | Facility: CLINIC | Age: 70
Setting detail: DERMATOLOGY
End: 2025-04-24

## 2025-04-24 DIAGNOSIS — L65.0 TELOGEN EFFLUVIUM: ICD-10-CM | Status: INADEQUATELY CONTROLLED

## 2025-04-24 DIAGNOSIS — L40.3 PUSTULOSIS PALMARIS ET PLANTARIS: ICD-10-CM

## 2025-04-24 DIAGNOSIS — B00.1 HERPESVIRAL VESICULAR DERMATITIS: ICD-10-CM

## 2025-04-24 PROCEDURE — ? ORDER TESTS

## 2025-04-24 PROCEDURE — ? PRESCRIPTION MEDICATION MANAGEMENT

## 2025-04-24 PROCEDURE — ? TREATMENT REGIMEN

## 2025-04-24 PROCEDURE — ? COUNSELING

## 2025-04-24 PROCEDURE — ? PRESCRIPTION

## 2025-04-24 PROCEDURE — 99214 OFFICE O/P EST MOD 30 MIN: CPT

## 2025-04-24 RX ORDER — ACYCLOVIR 400 MG/1
1 TABLET ORAL TID
Qty: 30 | Refills: 3 | Status: ERX | COMMUNITY
Start: 2025-04-24

## 2025-04-24 RX ORDER — ACYCLOVIR 50 MG/G
1 CREAM TOPICAL
Qty: 10 | Refills: 3 | Status: ERX | COMMUNITY
Start: 2025-04-24

## 2025-04-24 RX ADMIN — ACYCLOVIR 1: 400 TABLET ORAL at 00:00

## 2025-04-24 RX ADMIN — ACYCLOVIR 1: 50 CREAM TOPICAL at 00:00

## 2025-04-24 ASSESSMENT — LOCATION ZONE DERM
LOCATION ZONE: FEET
LOCATION ZONE: SCALP
LOCATION ZONE: TRUNK

## 2025-04-24 ASSESSMENT — LOCATION DETAILED DESCRIPTION DERM
LOCATION DETAILED: LEFT PLANTAR FOREFOOT OVERLYING 1ST METATARSAL
LOCATION DETAILED: POSTERIOR MID-PARIETAL SCALP
LOCATION DETAILED: LEFT INFERIOR MEDIAL LOWER BACK

## 2025-04-24 ASSESSMENT — PGA PSORIASIS: PGA PSORIASIS 2020: MODERATE

## 2025-04-24 ASSESSMENT — LOCATION SIMPLE DESCRIPTION DERM
LOCATION SIMPLE: LEFT PLANTAR SURFACE
LOCATION SIMPLE: POSTERIOR SCALP
LOCATION SIMPLE: LEFT LOWER BACK